# Patient Record
Sex: FEMALE | Race: WHITE | NOT HISPANIC OR LATINO
[De-identification: names, ages, dates, MRNs, and addresses within clinical notes are randomized per-mention and may not be internally consistent; named-entity substitution may affect disease eponyms.]

---

## 2017-01-19 ENCOUNTER — APPOINTMENT (OUTPATIENT)
Dept: UROLOGY | Facility: CLINIC | Age: 62
End: 2017-01-19

## 2017-03-13 ENCOUNTER — OUTPATIENT (OUTPATIENT)
Dept: OUTPATIENT SERVICES | Facility: HOSPITAL | Age: 62
LOS: 1 days | Discharge: HOME | End: 2017-03-13

## 2017-03-13 DIAGNOSIS — D05.00 LOBULAR CARCINOMA IN SITU OF UNSPECIFIED BREAST: ICD-10-CM

## 2017-03-13 DIAGNOSIS — D30.00 BENIGN NEOPLASM OF UNSPECIFIED KIDNEY: ICD-10-CM

## 2017-03-15 ENCOUNTER — APPOINTMENT (OUTPATIENT)
Dept: HEMATOLOGY ONCOLOGY | Facility: CLINIC | Age: 62
End: 2017-03-15

## 2017-03-15 VITALS
DIASTOLIC BLOOD PRESSURE: 79 MMHG | WEIGHT: 189.4 LBS | HEART RATE: 78 BPM | TEMPERATURE: 97.8 F | RESPIRATION RATE: 16 BRPM | OXYGEN SATURATION: 97 % | SYSTOLIC BLOOD PRESSURE: 150 MMHG | BODY MASS INDEX: 29.23 KG/M2

## 2017-08-01 ENCOUNTER — OUTPATIENT (OUTPATIENT)
Dept: OUTPATIENT SERVICES | Facility: HOSPITAL | Age: 62
LOS: 1 days | Discharge: HOME | End: 2017-08-01
Payer: COMMERCIAL

## 2017-08-01 VITALS
WEIGHT: 179.9 LBS | HEART RATE: 69 BPM | HEIGHT: 68 IN | RESPIRATION RATE: 69 BRPM | OXYGEN SATURATION: 98 % | SYSTOLIC BLOOD PRESSURE: 125 MMHG | DIASTOLIC BLOOD PRESSURE: 78 MMHG | TEMPERATURE: 98 F

## 2017-08-01 DIAGNOSIS — E78.5 HYPERLIPIDEMIA, UNSPECIFIED: ICD-10-CM

## 2017-08-01 DIAGNOSIS — D05.00 LOBULAR CARCINOMA IN SITU OF UNSPECIFIED BREAST: ICD-10-CM

## 2017-08-01 DIAGNOSIS — E11.9 TYPE 2 DIABETES MELLITUS WITHOUT COMPLICATIONS: ICD-10-CM

## 2017-08-01 DIAGNOSIS — I10 ESSENTIAL (PRIMARY) HYPERTENSION: ICD-10-CM

## 2017-08-01 DIAGNOSIS — Z01.818 ENCOUNTER FOR OTHER PREPROCEDURAL EXAMINATION: ICD-10-CM

## 2017-08-01 LAB
ANION GAP SERPL CALC-SCNC: 10 MMOL/L — SIGNIFICANT CHANGE UP (ref 5–17)
APTT BLD: 40.9 SEC — HIGH (ref 27.5–37.4)
BUN SERPL-MCNC: 13 MG/DL — SIGNIFICANT CHANGE UP (ref 7–23)
CALCIUM SERPL-MCNC: 10.1 MG/DL — SIGNIFICANT CHANGE UP (ref 8.5–10.1)
CHLORIDE SERPL-SCNC: 100 MMOL/L — SIGNIFICANT CHANGE UP (ref 96–108)
CO2 SERPL-SCNC: 29 MMOL/L — SIGNIFICANT CHANGE UP (ref 22–31)
CREAT SERPL-MCNC: 0.6 MG/DL — SIGNIFICANT CHANGE UP (ref 0.5–1.3)
GLUCOSE SERPL-MCNC: 142 MG/DL — HIGH (ref 70–99)
HCT VFR BLD CALC: 45.5 % — HIGH (ref 34.5–45)
HGB BLD-MCNC: 14.3 G/DL — SIGNIFICANT CHANGE UP (ref 11.5–15.5)
INR BLD: 0.96 RATIO — SIGNIFICANT CHANGE UP (ref 0.88–1.16)
MCHC RBC-ENTMCNC: 28.9 PG — SIGNIFICANT CHANGE UP (ref 27–34)
MCHC RBC-ENTMCNC: 31.4 GM/DL — LOW (ref 32–36)
MCV RBC AUTO: 92.2 FL — SIGNIFICANT CHANGE UP (ref 80–100)
PLATELET # BLD AUTO: 303 K/UL — SIGNIFICANT CHANGE UP (ref 150–400)
POTASSIUM SERPL-MCNC: 4.2 MMOL/L — SIGNIFICANT CHANGE UP (ref 3.5–5.3)
POTASSIUM SERPL-SCNC: 4.2 MMOL/L — SIGNIFICANT CHANGE UP (ref 3.5–5.3)
PROTHROM AB SERPL-ACNC: 10.5 SEC — SIGNIFICANT CHANGE UP (ref 9.8–12.7)
RBC # BLD: 4.94 M/UL — SIGNIFICANT CHANGE UP (ref 3.8–5.2)
RBC # FLD: 13.7 % — SIGNIFICANT CHANGE UP (ref 11–15)
SODIUM SERPL-SCNC: 139 MMOL/L — SIGNIFICANT CHANGE UP (ref 135–145)
WBC # BLD: 8.4 K/UL — SIGNIFICANT CHANGE UP (ref 3.8–10.5)
WBC # FLD AUTO: 8.4 K/UL — SIGNIFICANT CHANGE UP (ref 3.8–10.5)

## 2017-08-01 PROCEDURE — 93010 ELECTROCARDIOGRAM REPORT: CPT | Mod: NC

## 2017-08-01 NOTE — H&P PST ADULT - FAMILY HISTORY
Father  Still living? Unknown  Family history of stroke, Age at diagnosis: Age Unknown     Mother  Still living? Unknown  Family history of Parkinson disease, Age at diagnosis: Age Unknown

## 2017-08-01 NOTE — H&P PST ADULT - ASSESSMENT
61 YOF with a history of breast cancer, DM, HTN here c/o congestion and hoarse voice. Denies any complaints. On exam, Left nasal congestion noted, with hoarse voice. Plan for direct laringoscopy with excision of vocal cords polyps, septoplasty and left nasal valve repair on 8/9/17. Will need medical clearance. 61 YOF with a history of breast cancer, DM, HTN here c/o congestion and hoarse voice. Denies any complaints. On exam, Left nasal congestion noted, with hoarse voice. Plan for direct microlaryngoscopy with excision of vocal cords polyp, septoplasty and left nasal valve repair on 8/9/17. Will need medical clearance.

## 2017-08-01 NOTE — H&P PST ADULT - HISTORY OF PRESENT ILLNESS
61 YOF with history of breast cancer, HTN and DM here c/o congestion for many years and hoarse voice since march. States she had sinus surgery in 2011 however did not help. Took Medrol and Flonase without any help. Denies any other complaints. Denies cp, sob, palpitations, dizziness, headache, N/V, diarrhea, constipation. 61 YOF with history of breast cancer, HTN and DM here c/o congestion for many years and hoarse voice since March. States she had sinus surgery in 2011 however did not help. Took Medrol and Flonase without any help. Noted to have a polypoid growth from right arytenoid hitting the false cord and irritating the right vocal cord.  Also a left nasal septal deviation with superior adhesion/narrow left nasal valve.     Denies any other complaints. Denies cp, sob, palpitations, dizziness, headache, N/V, diarrhea, constipation.     Elevated preop PTT 40.9 but repeat normal 34.4.

## 2017-08-01 NOTE — H&P PST ADULT - PSH
H/O:   x 2    History of Dilation and Curettage    History of Tonsillectomy    S/P Exploratory Laparotomy    S/P GABRIEL-BSO (Total Abdominal Hysterectomy and Bilateral Salpingo-Oophorectomy)  2009

## 2017-08-01 NOTE — H&P PST ADULT - PMH
Diabetes    HLD (Hyperlipidemia)    HTN (Hypertension)    Lobular Carcinoma in Situ of Breast    Uterine Fibroids

## 2017-08-09 ENCOUNTER — RESULT REVIEW (OUTPATIENT)
Age: 62
End: 2017-08-09

## 2017-08-09 ENCOUNTER — TRANSCRIPTION ENCOUNTER (OUTPATIENT)
Age: 62
End: 2017-08-09

## 2017-08-09 ENCOUNTER — OUTPATIENT (OUTPATIENT)
Dept: OUTPATIENT SERVICES | Facility: HOSPITAL | Age: 62
LOS: 1 days | Discharge: HOME | End: 2017-08-09
Payer: COMMERCIAL

## 2017-08-09 VITALS
HEART RATE: 86 BPM | SYSTOLIC BLOOD PRESSURE: 120 MMHG | TEMPERATURE: 98 F | DIASTOLIC BLOOD PRESSURE: 74 MMHG | OXYGEN SATURATION: 96 % | RESPIRATION RATE: 19 BRPM

## 2017-08-09 VITALS
HEART RATE: 77 BPM | WEIGHT: 179.9 LBS | DIASTOLIC BLOOD PRESSURE: 77 MMHG | HEIGHT: 67 IN | RESPIRATION RATE: 18 BRPM | TEMPERATURE: 98 F | SYSTOLIC BLOOD PRESSURE: 132 MMHG | OXYGEN SATURATION: 96 %

## 2017-08-09 PROCEDURE — 88311 DECALCIFY TISSUE: CPT | Mod: 26

## 2017-08-09 PROCEDURE — 88304 TISSUE EXAM BY PATHOLOGIST: CPT | Mod: 26

## 2017-08-09 PROCEDURE — 88305 TISSUE EXAM BY PATHOLOGIST: CPT | Mod: 26

## 2017-08-09 RX ORDER — OXYMETAZOLINE HYDROCHLORIDE 0.5 MG/ML
3 SPRAY NASAL ONCE
Qty: 0 | Refills: 0 | Status: COMPLETED | OUTPATIENT
Start: 2017-08-09 | End: 2017-08-09

## 2017-08-09 RX ORDER — CANAGLIFLOZIN 100 MG/1
1 TABLET, FILM COATED ORAL
Qty: 0 | Refills: 0 | COMMUNITY

## 2017-08-09 RX ORDER — ASPIRIN/CALCIUM CARB/MAGNESIUM 324 MG
0 TABLET ORAL
Qty: 0 | Refills: 0 | COMMUNITY

## 2017-08-09 RX ORDER — METFORMIN HYDROCHLORIDE 850 MG/1
1 TABLET ORAL
Qty: 0 | Refills: 0 | COMMUNITY

## 2017-08-09 RX ORDER — SITAGLIPTIN 50 MG/1
1 TABLET, FILM COATED ORAL
Qty: 0 | Refills: 0 | COMMUNITY

## 2017-08-09 RX ORDER — SODIUM CHLORIDE 9 MG/ML
3 INJECTION INTRAMUSCULAR; INTRAVENOUS; SUBCUTANEOUS EVERY 8 HOURS
Qty: 0 | Refills: 0 | Status: DISCONTINUED | OUTPATIENT
Start: 2017-08-09 | End: 2017-08-09

## 2017-08-09 RX ORDER — DILTIAZEM HCL 120 MG
1 CAPSULE, EXT RELEASE 24 HR ORAL
Qty: 0 | Refills: 0 | COMMUNITY

## 2017-08-09 RX ORDER — COCAINE HCL 4 %
1 SOLUTION, NON-ORAL TOPICAL ONCE
Qty: 0 | Refills: 0 | Status: DISCONTINUED | OUTPATIENT
Start: 2017-08-09 | End: 2017-08-09

## 2017-08-09 RX ADMIN — OXYMETAZOLINE HYDROCHLORIDE 3 SPRAY(S): 0.5 SPRAY NASAL at 12:51

## 2017-08-09 NOTE — BRIEF OPERATIVE NOTE - PRE-OP DX
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

## 2017-08-09 NOTE — ASU DISCHARGE PLAN (ADULT/PEDIATRIC). - INSTRUCTIONS
Eat light first 24 hours and progress diet slowly to avoid nausea and vomiting from anesthesia. Call for appt ASAP [phone]

## 2017-08-09 NOTE — ASU DISCHARGE PLAN (ADULT/PEDIATRIC). - SPECIAL INSTRUCTIONS
For laryngeal surgery:    Absolute voice rest x 24 hours, then very limited voice use x 7 days, longer if hoarseness or throat pain develops.  No aspirin, Motrin, Ibuprofen, Advil, Aleve, Pepto Bismo, [place] - no blood thinners - for 3 weeks  Let hot foods and liquids cool somewhat for 3-4 days, softer foods 3-4 days  Limited activity x 7 days  Take antibiotic and probiotic (i.e. Acidophilus) as prescribed  Take pain med prescribed as needed    For nasal surgery:  Will leave nasal silastic stents in place for 10 days    Change nasal drip pad as needed  No nose blowing, bending, straining or lifting x 7 days  Limited activity x 7 days  Avoid Aspirin, Motrin, Ibuprofen, Advil, Aleve, Pepto Bismo, [place], Vit E, etc. - No Blood Thinners - x 3 weeks  Take antibiotic and probiotic (i.e. Acidophilus) as prescribed  Take pain med prescribed as needed  Tomorrow begin Nasogel spray: 2-3 sprays each nostril 3x/day

## 2017-08-09 NOTE — ASU DISCHARGE PLAN (ADULT/PEDIATRIC). - MEDICATION SUMMARY - MEDICATIONS TO TAKE
I will START or STAY ON the medications listed below when I get home from the hospital:    Tiazac 180 mg/24 hours oral capsule, extended release  -- 1 cap(s) by mouth once a day  -- Indication: For HTN    metFORMIN 500 mg oral tablet  -- 1 tab(s) by mouth 2 times a day  -- Indication: For DM    Januvia 100 mg oral tablet  -- 1 tab(s) by mouth once a day  -- Indication: For DM    Invokana 100 mg oral tablet  -- 1 tab(s) by mouth once a day  -- Indication: For DM    Diovan  mg-12.5 mg oral tablet  -- 1 tab(s) by mouth once a day  -- Indication: For HTN

## 2017-08-09 NOTE — BRIEF OPERATIVE NOTE - PROCEDURE
Outfracture or cautery of nasal turbinates  08/09/2017  Bilateral  Active  RBECKHARDT  Laryngoscopy, direct, microscope, with tumor excision  08/09/2017  Right arytenoid, left true vocal cord,   Lesions appeared identical, not typical for polyps, nodules or papilloma,  Active  RBECKHARDT  Nasal septoplasty  08/09/2017    Active  RBECKHARDT  Repair of nasal valve stenosis on left side  08/09/2017    Active  RBECKHARDT  Lysis of adhesions of nose  08/09/2017  Left inferior turbinate to septum  Active  RBECKHARDT

## 2017-08-09 NOTE — ASU DISCHARGE PLAN (ADULT/PEDIATRIC). - NOTIFY
Excessive Diarrhea/Inability to Tolerate Liquids or Foods/Unable to Urinate/Bleeding that does not stop/Swelling that continues/Pain not relieved by Medications/Fever greater than 101/Persistent Nausea and Vomiting/Increased Irritability or Sluggishness

## 2017-08-09 NOTE — BRIEF OPERATIVE NOTE - POST-OP DX
Benign neoplasm of arytenoid fold  08/09/2017  Right  Active  Andre Hannah  Deviated nasal septum  08/09/2017    Active  Andre Hannah  Nasal turbinate hypertrophy  08/09/2017  Inferior, bilateral  Active  Andre Hannah  Nasal valve stenosis  08/09/2017  Left  Active  Andre Hannah Benign neoplasm of arytenoid fold  08/09/2017  Right  Active  Andre Hannah  Deviated nasal septum  08/09/2017    Active  Andre Hannah  Nasal turbinate hypertrophy  08/09/2017  Inferior, bilateral  Active  Andre Hannah  Nasal valve stenosis  08/09/2017  Left  Active  Andre Hannah  Vocal cord mass  08/09/2017  Left, superior lateral surface, mid cord.  Active  Andre Hannah

## 2017-08-14 LAB — SURGICAL PATHOLOGY FINAL REPORT - CH: SIGNIFICANT CHANGE UP

## 2017-08-15 DIAGNOSIS — E78.5 HYPERLIPIDEMIA, UNSPECIFIED: ICD-10-CM

## 2017-08-15 DIAGNOSIS — Z90.710 ACQUIRED ABSENCE OF BOTH CERVIX AND UTERUS: ICD-10-CM

## 2017-08-15 DIAGNOSIS — J34.89 OTHER SPECIFIED DISORDERS OF NOSE AND NASAL SINUSES: ICD-10-CM

## 2017-08-15 DIAGNOSIS — Z85.3 PERSONAL HISTORY OF MALIGNANT NEOPLASM OF BREAST: ICD-10-CM

## 2017-08-15 DIAGNOSIS — J34.3 HYPERTROPHY OF NASAL TURBINATES: ICD-10-CM

## 2017-08-15 DIAGNOSIS — D14.1 BENIGN NEOPLASM OF LARYNX: ICD-10-CM

## 2017-08-15 DIAGNOSIS — R49.0 DYSPHONIA: ICD-10-CM

## 2017-08-15 DIAGNOSIS — I10 ESSENTIAL (PRIMARY) HYPERTENSION: ICD-10-CM

## 2017-08-15 DIAGNOSIS — E11.9 TYPE 2 DIABETES MELLITUS WITHOUT COMPLICATIONS: ICD-10-CM

## 2017-08-15 DIAGNOSIS — J34.2 DEVIATED NASAL SEPTUM: ICD-10-CM

## 2017-09-13 ENCOUNTER — OUTPATIENT (OUTPATIENT)
Dept: OUTPATIENT SERVICES | Facility: HOSPITAL | Age: 62
LOS: 1 days | Discharge: HOME | End: 2017-09-13

## 2017-09-13 DIAGNOSIS — D05.00 LOBULAR CARCINOMA IN SITU OF UNSPECIFIED BREAST: ICD-10-CM

## 2018-03-06 ENCOUNTER — OUTPATIENT (OUTPATIENT)
Dept: OUTPATIENT SERVICES | Facility: HOSPITAL | Age: 63
LOS: 1 days | Discharge: HOME | End: 2018-03-06

## 2018-03-06 DIAGNOSIS — D30.00 BENIGN NEOPLASM OF UNSPECIFIED KIDNEY: ICD-10-CM

## 2018-03-06 DIAGNOSIS — D05.00 LOBULAR CARCINOMA IN SITU OF UNSPECIFIED BREAST: ICD-10-CM

## 2018-03-09 ENCOUNTER — APPOINTMENT (OUTPATIENT)
Dept: HEMATOLOGY ONCOLOGY | Facility: CLINIC | Age: 63
End: 2018-03-09
Payer: COMMERCIAL

## 2018-03-09 VITALS
WEIGHT: 186 LBS | RESPIRATION RATE: 16 BRPM | HEART RATE: 71 BPM | DIASTOLIC BLOOD PRESSURE: 80 MMHG | TEMPERATURE: 98.4 F | SYSTOLIC BLOOD PRESSURE: 124 MMHG | BODY MASS INDEX: 28.7 KG/M2 | OXYGEN SATURATION: 98 %

## 2018-03-09 DIAGNOSIS — J38.1 POLYP OF VOCAL CORD AND LARYNX: ICD-10-CM

## 2018-03-09 DIAGNOSIS — D30.00 BENIGN NEOPLASM OF UNSPECIFIED KIDNEY: ICD-10-CM

## 2018-03-09 PROCEDURE — 99213 OFFICE O/P EST LOW 20 MIN: CPT

## 2018-03-09 RX ORDER — UBIDECARENONE 100 MG
100 CAPSULE ORAL
Refills: 0 | Status: ACTIVE | COMMUNITY
Start: 2018-03-09

## 2018-10-25 ENCOUNTER — APPOINTMENT (OUTPATIENT)
Dept: UROLOGY | Facility: CLINIC | Age: 63
End: 2018-10-25

## 2019-01-09 ENCOUNTER — APPOINTMENT (OUTPATIENT)
Dept: UROLOGY | Facility: CLINIC | Age: 64
End: 2019-01-09

## 2019-02-01 ENCOUNTER — APPOINTMENT (OUTPATIENT)
Dept: BREAST CENTER | Facility: CLINIC | Age: 64
End: 2019-02-01

## 2019-02-25 ENCOUNTER — OUTPATIENT (OUTPATIENT)
Dept: OUTPATIENT SERVICES | Facility: HOSPITAL | Age: 64
LOS: 1 days | Discharge: HOME | End: 2019-02-25

## 2019-02-25 DIAGNOSIS — D05.00 LOBULAR CARCINOMA IN SITU OF UNSPECIFIED BREAST: ICD-10-CM

## 2019-02-25 DIAGNOSIS — D30.00 BENIGN NEOPLASM OF UNSPECIFIED KIDNEY: ICD-10-CM

## 2019-03-01 ENCOUNTER — OUTPATIENT (OUTPATIENT)
Dept: OUTPATIENT SERVICES | Facility: HOSPITAL | Age: 64
LOS: 1 days | Discharge: HOME | End: 2019-03-01

## 2019-03-01 ENCOUNTER — APPOINTMENT (OUTPATIENT)
Dept: HEMATOLOGY ONCOLOGY | Facility: CLINIC | Age: 64
End: 2019-03-01
Payer: COMMERCIAL

## 2019-03-01 VITALS
SYSTOLIC BLOOD PRESSURE: 121 MMHG | OXYGEN SATURATION: 99 % | DIASTOLIC BLOOD PRESSURE: 77 MMHG | BODY MASS INDEX: 28.75 KG/M2 | TEMPERATURE: 98.2 F | WEIGHT: 186.29 LBS | HEART RATE: 64 BPM | RESPIRATION RATE: 16 BRPM

## 2019-03-01 DIAGNOSIS — Z00.00 ENCOUNTER FOR GENERAL ADULT MEDICAL EXAMINATION W/OUT ABNORMAL FINDINGS: ICD-10-CM

## 2019-03-01 DIAGNOSIS — D30.00 BENIGN NEOPLASM OF UNSPECIFIED KIDNEY: ICD-10-CM

## 2019-03-01 PROCEDURE — 99213 OFFICE O/P EST LOW 20 MIN: CPT

## 2019-03-01 NOTE — REVIEW OF SYSTEMS
[Insomnia] : insomnia [Anxiety] : anxiety [Negative] : Allergic/Immunologic [FreeTextEntry2] : Energy level WNL. Had flu  vaccination 9/2018. [FreeTextEntry4] : Had repeat removal of polyp from vocal cords 8/2018.  Had follow up 2/1/2019, exam OK. [FreeTextEntry7] : Last colonoscopy 11/2018 with Dr Lul Rose, polyps found.Was told to repeat in 2021. [FreeTextEntry8] : S/P GABRIEL/BSO, Last GYN exam 2018. Denies abnormal  spotting/bleeding. [FreeTextEntry9] : Most recent BMD 10/21/2016. [de-identified] : Ongoing eczema. [de-identified] : Insomnia  and anxiety controlled with lorazepam.

## 2019-03-01 NOTE — HISTORY OF PRESENT ILLNESS
[Disease: _____________________] : Disease: [unfilled] [T: ___] : T[unfilled] [AJCC Stage: ____] : AJCC Stage: [unfilled] [Treatment Protocol] : Treatment Protocol [de-identified] : The patient presented age 44 with LCIS had a breast biopsy.\par \par In sagittal and the patient decided she wanted to initiate chemoprevention.\par \par In July 2011 the patient initiated treatment with tamoxifen which she continues through the present time.\par \par The patient has had a negative multisite 3 BRACAnalysis. [FreeTextEntry1] : Currently on Tamoxifen from 07/2011 - 8/22/2016. [de-identified] : The patient has been 18  years 1  month since LCIS diagnosis.\par \par The patient  completed  tamoxifen  5 years of Tamoxifen 2  years  6  months ago.\par \par Breast MRI 2/1/2019 stable, BIRADS 2. Liver not mentioned.\par \par Most recent bilateral mammogram/breast ultrasound was 7/9/2018, BI RADS 2\par \par Last  saw breast surgeon Lexis Robertson  03/20/2015. .\par \par MRI abdomen/pelvis 10/4/2018, enlarging angiomyolipoma, stable hepatic hemangiomas.\par \par Has new PCP Dr Claudia Padilla, had exam 10/2018, seeing her again later in 3/2019.\par \par No other interval events.\par \par No other interval event since last seen.\par \par \par \par  \par \par

## 2019-03-01 NOTE — PHYSICAL EXAM
[Fully active, able to carry on all pre-disease performance without restriction] : Status 0 - Fully active, able to carry on all pre-disease performance without restriction [Normal] : grossly intact [de-identified] : 1/6 MK [id number] [de-identified] : Asymmetry, right breast larger than left. Right breast: scar 12:00 extending to UIQ no masses.  Left breast:  no masses [de-identified] : Transverse scar lower abdomen [de-identified] : Episode of crying in treatment room

## 2020-07-29 ENCOUNTER — APPOINTMENT (OUTPATIENT)
Dept: UROLOGY | Facility: CLINIC | Age: 65
End: 2020-07-29

## 2020-12-30 ENCOUNTER — APPOINTMENT (OUTPATIENT)
Dept: BREAST CENTER | Facility: CLINIC | Age: 65
End: 2020-12-30

## 2021-01-08 ENCOUNTER — APPOINTMENT (OUTPATIENT)
Dept: BREAST CENTER | Facility: CLINIC | Age: 66
End: 2021-01-08
Payer: COMMERCIAL

## 2021-01-08 VITALS
DIASTOLIC BLOOD PRESSURE: 69 MMHG | BODY MASS INDEX: 28.95 KG/M2 | HEART RATE: 78 BPM | WEIGHT: 191 LBS | HEIGHT: 68 IN | SYSTOLIC BLOOD PRESSURE: 125 MMHG

## 2021-01-08 DIAGNOSIS — E04.1 NONTOXIC SINGLE THYROID NODULE: ICD-10-CM

## 2021-01-08 PROCEDURE — 99213 OFFICE O/P EST LOW 20 MIN: CPT

## 2021-01-08 PROCEDURE — 99072 ADDL SUPL MATRL&STAF TM PHE: CPT

## 2021-01-08 RX ORDER — ROSUVASTATIN CALCIUM 10 MG/1
10 TABLET, FILM COATED ORAL
Refills: 0 | Status: ACTIVE | COMMUNITY

## 2021-01-08 RX ORDER — ROSUVASTATIN CALCIUM 5 MG/1
5 TABLET, FILM COATED ORAL
Refills: 0 | Status: DISCONTINUED | COMMUNITY
Start: 2018-03-09 | End: 2021-01-08

## 2021-01-08 RX ORDER — METFORMIN HYDROCHLORIDE 1000 MG/1
1000 TABLET, FILM COATED ORAL
Refills: 0 | Status: ACTIVE | COMMUNITY

## 2021-01-08 RX ORDER — B-COMPLEX WITH VITAMIN C
TABLET ORAL
Refills: 0 | Status: ACTIVE | COMMUNITY

## 2021-01-08 NOTE — PHYSICAL EXAM
[EOMI] : extra ocular movement intact [Sclera nonicteric] : sclera nonicteric [Supple] : supple [No Supraclavicular Adenopathy] : no supraclavicular adenopathy [No Cervical Adenopathy] : no cervical adenopathy [No Thyromegaly] : no thyromegaly [Clear to Auscultation Bilat] : clear to auscultation bilaterally [Normal Sinus Rhythm] : normal sinus rhythm [Normal S1, S2] : normal S1 and S2 [Examined in the supine and seated position] : examined in the supine and seated position [No dominant masses] : no dominant masses in right breast  [No dominant masses] : no dominant masses left breast [No Nipple Retraction] : no left nipple retraction [No Nipple Discharge] : no left nipple discharge [No Axillary Lymphadenopathy] : no left axillary lymphadenopathy [Soft] : abdomen soft [Not Tender] : non-tender [de-identified] : Scar UIQ. [de-identified] : Slightly glandular prominence 2:00- office ultrasound normal.

## 2021-01-08 NOTE — DATA REVIEWED
[FreeTextEntry1] : Bilateral breast MRI 3/5/2020:  No MRI evidence of malignancy. (Stable enhancing mass lower outer right)\par \par Bilateral mammogram 10/7/2020  Right focal asymmetry and left asymmetry.  Recall for additional views.\par \par Bilateral mammogram 10/7/2020:  Right asymmetry effaces.  Left asymmetry effaces.\par \par Bilateral breast ultrasound 10/7/2020:  Right stable HN 1N5 [id number] mm since 8/2019.  Benign cyst 3N1, hypoechoic mass 11N4 9x5x9 stable since 2009. Follow-up right ultrasound in 6 months.\par \par (Current images reviewed and returned to the patient.)

## 2021-01-08 NOTE — PAST MEDICAL HISTORY
[Postmenopausal] : The patient is postmenopausal [Menarche Age ____] : age at menarche was [unfilled] [Total Preg ___] : G[unfilled] [Live Births ___] : P[unfilled]  [Age At Live Birth ___] : Age at live birth: [unfilled] [History of Hormone Replacement Treatment] : has no history of hormone replacement treatment [FreeTextEntry2] : 2 sons [FreeTextEntry7] : no [FreeTextEntry8] : 10 months each

## 2021-09-07 ENCOUNTER — NON-APPOINTMENT (OUTPATIENT)
Age: 66
End: 2021-09-07

## 2021-09-07 ENCOUNTER — APPOINTMENT (OUTPATIENT)
Dept: ORTHOPEDIC SURGERY | Facility: CLINIC | Age: 66
End: 2021-09-07
Payer: COMMERCIAL

## 2021-09-07 VITALS — HEIGHT: 68 IN | BODY MASS INDEX: 27.28 KG/M2 | WEIGHT: 180 LBS

## 2021-09-07 DIAGNOSIS — M70.62 TROCHANTERIC BURSITIS, LEFT HIP: ICD-10-CM

## 2021-09-07 DIAGNOSIS — M54.16 RADICULOPATHY, LUMBAR REGION: ICD-10-CM

## 2021-09-07 PROCEDURE — 99204 OFFICE O/P NEW MOD 45 MIN: CPT | Mod: 25

## 2021-09-07 PROCEDURE — 72100 X-RAY EXAM L-S SPINE 2/3 VWS: CPT

## 2021-09-07 PROCEDURE — 73502 X-RAY EXAM HIP UNI 2-3 VIEWS: CPT | Mod: LT

## 2021-09-07 PROCEDURE — 20610 DRAIN/INJ JOINT/BURSA W/O US: CPT | Mod: LT

## 2021-09-07 NOTE — PHYSICAL EXAM
[Antalgic] : antalgic [Normal RUE] : Right Upper Extremity: No scars, rashes, lesions, ulcers, skin intact [Normal Finger/nose] : finger to nose coordination [Normal Touch] : sensation intact for touch [Poor Appearance] : well-appearing [Acute Distress] : not in acute distress [Obese] : obese [Normal] : no peripheral adenopathy appreciated [de-identified] : Patient appears stated age in no acute respiratory distress. Patient is alert oriented x3. Patient has normal mood and affect. \par Bilateral knee exam\par Range of motion of the knee is 0-120°. \par Skin is normal.  No rash.\par There is no effusion. No medial or lateral joint line tenderness. No swelling, no pitting edema.\par Overall alignment of the knee is then slight varus. Good anterior posterior stability. Firm endpoints on anterior and posterior drawer. \par Medial lateral stability is intact. Firm endpoint on medial and lateral stress testing .\par Luis test is negative.\par Quadriceps strength 5/ 5. There is no loss of muscle volume in the thigh. \par Good anterior posterior and mediolateral stability.\par Sensation in the extremities intact. \par Discrimination is intact. Good DP and PT pulses.\par 		\par \par Bilateral hip exam\par On inspection of the hip shows skin is normal. No evidence of rash. \par No loss of muscle.  Abductor strength is 5 out of 5. Hip flexor strength is 5.\par Range of motion of the hip at 90° flexion internal rotation is 15° external rotation is 30° pain-free. \par Hip has good stability in anterior and posterior direction. \par On lateral decubitus  examination there is no tenderness in the greater trochanter.  On the left hip there is tenderness in the posterior part of the greater trochanter\par Lower Extremity Examination \par Bilateral lower extremity skin is normal. There is no rash. There is no edema and lymphadenopathy.  DP and PT pulses intact. Sensation is intact. [de-identified] : X-rays of the left hip 2 view shows well-preserved joint space no bony abnormalities.\par \par X-rays of the lumbosacral spine 2 view shows mild degenerative changes.

## 2021-09-07 NOTE — DISCUSSION/SUMMARY
[de-identified] : Patient has left hip greater trochanteric bursitis.  Patient also has some lumbar degenerative changes.\par \par She is considering bursa injection\par \par \par \par Risks and benefits of the trochanteric bursa injection was discussed with the patient. Verbal consent was obtained. Area was prepped with Betadine. Local anesthetic spray was used. 2 cc of Depo-Medrol 3 cc of lidocaine was injected in the posterior part of the greater trochanter Patient tolerated the procedure well. Band-Aid was applied.\par \par NSAIDs therapy

## 2021-09-07 NOTE — HISTORY OF PRESENT ILLNESS
[de-identified] : Patient is here for left hip pain \par \par The patient's past medical history, past surgical history, medications and allergies were reviewed by me today and documented accordingly. In addition, the patient's family and social history, which were noncontributory to this visit, were reviewed also. Intake form was reviewed. The patient has no family history of arthritis.  [2] : a minimum pain level of 2/10 [8] : a maximum pain level of 8/10 [Acetaminophen] : not relieved by acetaminophen [Exercise Regimen] : not relieved by exercise regimen [NSAIDs] : not relieved by nonsteroidal anti-inflammatory drugs

## 2021-09-23 NOTE — H&P PST ADULT - CARDIOVASCULAR
Chief Complaints and History of Present Illnesses   Patient presents with     Diabetic Eye Exam Follow Up     Type 2 diabetes mellitus without complication, with long-term current use of insulin      Chief Complaint(s) and History of Present Illness(es)     Diabetic Eye Exam Follow Up     Associated symptoms: Negative for photophobia, floaters, flashes and tearing    Diabetes Type: Type 2    Blood Sugars: is controlled    Treatments tried: no treatments    Pain scale: 0/10    Comments: Type 2 diabetes mellitus without complication, with long-term current use of insulin               Comments     Pt here for Diabetic eye exam  LBS:  83    Last A1C: 5.9  Lab Results       Component                Value               Date                       A1C                      5.9                 08/30/2021                 A1C                      5.8                 02/19/2021                 A1C                      5.8                 11/20/2020       Lexi Alfred COT 9:03 AM September 23, 2021                             negative Regular rate & rhythm, normal S1, S2; no murmurs, gallops or rubs; no S3, S4

## 2021-10-26 ENCOUNTER — APPOINTMENT (OUTPATIENT)
Dept: WOUND CARE | Facility: HOSPITAL | Age: 66
End: 2021-10-26
Payer: COMMERCIAL

## 2021-10-26 ENCOUNTER — OUTPATIENT (OUTPATIENT)
Dept: OUTPATIENT SERVICES | Facility: HOSPITAL | Age: 66
LOS: 1 days | Discharge: HOME | End: 2021-10-26
Payer: COMMERCIAL

## 2021-10-26 VITALS
WEIGHT: 176 LBS | DIASTOLIC BLOOD PRESSURE: 76 MMHG | HEIGHT: 67 IN | BODY MASS INDEX: 27.62 KG/M2 | TEMPERATURE: 97.5 F | HEART RATE: 81 BPM | RESPIRATION RATE: 18 BRPM | OXYGEN SATURATION: 100 % | SYSTOLIC BLOOD PRESSURE: 134 MMHG

## 2021-10-26 DIAGNOSIS — L89.300 PRESSURE ULCER OF UNSPECIFIED BUTTOCK, UNSTAGEABLE: ICD-10-CM

## 2021-10-26 PROCEDURE — G0463: CPT

## 2021-10-26 PROCEDURE — 99203 OFFICE O/P NEW LOW 30 MIN: CPT

## 2021-10-26 RX ORDER — SEMAGLUTIDE 1.34 MG/ML
2 INJECTION, SOLUTION SUBCUTANEOUS WEEKLY
Refills: 0 | Status: ACTIVE | COMMUNITY

## 2021-10-26 RX ORDER — DICLOFENAC SODIUM 75 MG/1
75 TABLET, DELAYED RELEASE ORAL TWICE DAILY
Qty: 20 | Refills: 1 | Status: DISCONTINUED | COMMUNITY
Start: 2021-09-07 | End: 2021-10-26

## 2021-10-26 RX ORDER — CANAGLIFLOZIN AND METFORMIN HYDROCHLORIDE 150; 1000 MG/1; MG/1
TABLET, FILM COATED, EXTENDED RELEASE ORAL TWICE DAILY
Refills: 0 | Status: ACTIVE | COMMUNITY

## 2021-10-26 RX ORDER — CANAGLIFLOZIN 300 MG/1
TABLET, FILM COATED ORAL
Refills: 0 | Status: DISCONTINUED | COMMUNITY
End: 2021-10-26

## 2021-10-26 NOTE — HISTORY OF PRESENT ILLNESS
[FreeTextEntry1] : Patient presents with a traumatic wound on her left buttocks.  Patient reports that in early 9/2021 she was prescribed Flexeral, lidocaine patch and heating pad for back pain.  Patient believes that she developed the wound from heating pad.  Patient was seen by PCP and is applying Collagenase and duoderm to wound every 3 days.

## 2021-10-26 NOTE — ASSESSMENT
[Verbal] : Verbal [Patient] : Patient [Good - alert, interested, motivated] : Good - alert, interested, motivated [Verbalizes knowledge/Understanding] : Verbalizes knowledge/understanding [Dressing changes] : dressing changes [Skin Care] : skin care [Signs and symptoms of infection] : sign and symptoms of infection [How and When to Call] : how and when to call [Patient responsibility to plan of care] : patient responsibility to plan of care [] : Yes [Stable] : stable [Home] : Home [Ambulatory] : Ambulatory [Not Applicable - Long Term Care/Home Health Agency] : Long Term Care/Home Health Agency: Not Applicable [FreeTextEntry2] : Promote optimal skin integrity, infection prevention\par  [FreeTextEntry4] : f/u 2 weeks

## 2021-10-26 NOTE — REVIEW OF SYSTEMS
[Negative] : Heme/Lymph [FreeTextEntry5] : hypertension, hyperlipidemia [de-identified] : burn left buttock [de-identified] : DM2

## 2021-10-26 NOTE — PHYSICAL EXAM
[Normal Breath Sounds] : Normal breath sounds [Normal Heart Sounds] : normal heart sounds [Alert] : alert [Oriented to Person] : oriented to person [Oriented to Place] : oriented to place [Oriented to Time] : oriented to time [Calm] : calm [2 x 2] : 2 x 2  [de-identified] : WD WN 66 Y/O with female in NAD [de-identified] : MONI LEWISM intact [de-identified] : supple [de-identified] : left buttock small almost completely closed buttock burn [de-identified] : MD lifted dry eschar revealing small open area [FreeTextEntry1] : Left buttocks [FreeTextEntry2] : 1.1 [FreeTextEntry3] : 3.0 [FreeTextEntry4] : 0.1 / dry eschar [de-identified] : small serosanguineous [de-identified] : lateral area with mild excoriation [de-identified] : 100% eschar [de-identified] : Alginate Ag  [de-identified] : NSC\par Allevyn Border [de-identified] : 3x Weekly

## 2021-10-26 NOTE — PLAN
[FreeTextEntry1] : AgAlginate and Allevyn border to left buttock 3X/week\par return 2 weeks\par 30 minutes spent in review, evaluation and treatment planning
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
## 2021-10-27 DIAGNOSIS — Z82.3 FAMILY HISTORY OF STROKE: ICD-10-CM
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
## 2021-10-27 DIAGNOSIS — T31.0 BURNS INVOLVING LESS THAN 10% OF BODY SURFACE: ICD-10-CM

## 2021-10-27 DIAGNOSIS — T21.25XD BURN OF SECOND DEGREE OF BUTTOCK, SUBSEQUENT ENCOUNTER: ICD-10-CM

## 2021-10-27 DIAGNOSIS — Z83.3 FAMILY HISTORY OF DIABETES MELLITUS: ICD-10-CM

## 2021-10-27 DIAGNOSIS — I10 ESSENTIAL (PRIMARY) HYPERTENSION: ICD-10-CM

## 2021-10-27 DIAGNOSIS — Z79.899 OTHER LONG TERM (CURRENT) DRUG THERAPY: ICD-10-CM

## 2021-10-27 DIAGNOSIS — Z82.0 FAMILY HISTORY OF EPILEPSY AND OTHER DISEASES OF THE NERVOUS SYSTEM: ICD-10-CM

## 2021-10-27 DIAGNOSIS — Z87.891 PERSONAL HISTORY OF NICOTINE DEPENDENCE: ICD-10-CM

## 2021-10-27 DIAGNOSIS — E11.9 TYPE 2 DIABETES MELLITUS WITHOUT COMPLICATIONS: ICD-10-CM

## 2021-10-27 DIAGNOSIS — Z98.890 OTHER SPECIFIED POSTPROCEDURAL STATES: ICD-10-CM
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
## 2021-10-27 DIAGNOSIS — E78.00 PURE HYPERCHOLESTEROLEMIA, UNSPECIFIED: ICD-10-CM

## 2021-10-27 DIAGNOSIS — Z86.000 PERSONAL HISTORY OF IN-SITU NEOPLASM OF BREAST: ICD-10-CM
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
## 2021-10-27 DIAGNOSIS — E04.1 NONTOXIC SINGLE THYROID NODULE: ICD-10-CM

## 2021-10-27 DIAGNOSIS — Z82.49 FAMILY HISTORY OF ISCHEMIC HEART DISEASE AND OTHER DISEASES OF THE CIRCULATORY SYSTEM: ICD-10-CM

## 2021-10-27 DIAGNOSIS — Z79.82 LONG TERM (CURRENT) USE OF ASPIRIN: ICD-10-CM

## 2021-10-27 DIAGNOSIS — Z80.3 FAMILY HISTORY OF MALIGNANT NEOPLASM OF BREAST: ICD-10-CM

## 2021-10-27 DIAGNOSIS — Z90.710 ACQUIRED ABSENCE OF BOTH CERVIX AND UTERUS: ICD-10-CM

## 2021-11-09 ENCOUNTER — OUTPATIENT (OUTPATIENT)
Dept: OUTPATIENT SERVICES | Facility: HOSPITAL | Age: 66
LOS: 1 days | Discharge: HOME | End: 2021-11-09
Payer: COMMERCIAL

## 2021-11-09 ENCOUNTER — APPOINTMENT (OUTPATIENT)
Dept: WOUND CARE | Facility: HOSPITAL | Age: 66
End: 2021-11-09
Payer: COMMERCIAL

## 2021-11-09 VITALS
RESPIRATION RATE: 18 BRPM | OXYGEN SATURATION: 99 % | HEART RATE: 76 BPM | BODY MASS INDEX: 27.62 KG/M2 | HEIGHT: 67 IN | WEIGHT: 176 LBS | SYSTOLIC BLOOD PRESSURE: 124 MMHG | TEMPERATURE: 97.8 F | DIASTOLIC BLOOD PRESSURE: 78 MMHG

## 2021-11-09 DIAGNOSIS — T21.25XD: ICD-10-CM

## 2021-11-09 DIAGNOSIS — T31.0 BURNS INVOLVING LESS THAN 10% OF BODY SURFACE: ICD-10-CM

## 2021-11-09 DIAGNOSIS — L89.300 PRESSURE ULCER OF UNSPECIFIED BUTTOCK, UNSTAGEABLE: ICD-10-CM

## 2021-11-09 PROCEDURE — 99213 OFFICE O/P EST LOW 20 MIN: CPT

## 2021-11-09 PROCEDURE — G0463: CPT

## 2021-11-09 NOTE — ASSESSMENT
[Verbal] : Verbal [Patient] : Patient [Good - alert, interested, motivated] : Good - alert, interested, motivated [Verbalizes knowledge/Understanding] : Verbalizes knowledge/understanding [Dressing changes] : dressing changes [Skin Care] : skin care [Signs and symptoms of infection] : sign and symptoms of infection [How and When to Call] : how and when to call [Patient responsibility to plan of care] : patient responsibility to plan of care [Stable] : stable [Home] : Home [Ambulatory] : Ambulatory [Not Applicable - Long Term Care/Home Health Agency] : Long Term Care/Home Health Agency: Not Applicable [] : Yes [FreeTextEntry2] : Pressure Relief \par Localized Wound Care \par Maintain Skin Integrity \par  [FreeTextEntry4] : Pt to F/U to [facility] in 1 Month

## 2021-11-09 NOTE — REVIEW OF SYSTEMS
[Negative] : Heme/Lymph [FreeTextEntry5] : hypertension, hyperlipidemia [de-identified] : burn left buttock [de-identified] : DM2

## 2021-11-09 NOTE — HISTORY OF PRESENT ILLNESS
[FreeTextEntry1] : Patient presents with a traumatic wound on her left buttocks.  Patient reports that in early 9/2021 she was prescribed Flexeral, lidocaine patch and heating pad for back pain.  Patient believes that she developed the wound from heating pad.  Patient was seen by PCP and is applying Collagenase and duoderm to wound every 3 days.  \par \par 11/9/21 wound left buttock virtually closed

## 2021-11-09 NOTE — PHYSICAL EXAM
[2 x 2] : 2 x 2  [Normal Breath Sounds] : Normal breath sounds [Normal Heart Sounds] : normal heart sounds [Alert] : alert [Oriented to Person] : oriented to person [Oriented to Place] : oriented to place [Oriented to Time] : oriented to time [Calm] : calm [de-identified] : WD WN 66 Y/O with female in NAD [de-identified] : MONI LEWISM intact [de-identified] : supple [de-identified] : left buttock small almost completely closed buttock burn [FreeTextEntry1] : Left buttocks [FreeTextEntry2] : 0.1 [FreeTextEntry3] : 0.1 [FreeTextEntry4] : 0.1 [de-identified] : Marcin Veronica  [de-identified] : Cleansed with Normal Saline. \par  [TWNoteComboBox4] : None [de-identified] : Normal [de-identified] : 3x Weekly

## 2021-11-09 NOTE — PLAN
[FreeTextEntry1] :  Allevyn border to left buttock 3X/week\par discharged\par 20 minutes spent in review, evaluation and treatment planning
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
## 2021-11-10 DIAGNOSIS — Z82.0 FAMILY HISTORY OF EPILEPSY AND OTHER DISEASES OF THE NERVOUS SYSTEM: ICD-10-CM

## 2021-11-10 DIAGNOSIS — Z90.710 ACQUIRED ABSENCE OF BOTH CERVIX AND UTERUS: ICD-10-CM

## 2021-11-10 DIAGNOSIS — Z80.3 FAMILY HISTORY OF MALIGNANT NEOPLASM OF BREAST: ICD-10-CM

## 2021-11-10 DIAGNOSIS — Z79.899 OTHER LONG TERM (CURRENT) DRUG THERAPY: ICD-10-CM
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
## 2021-11-10 DIAGNOSIS — I10 ESSENTIAL (PRIMARY) HYPERTENSION: ICD-10-CM

## 2021-11-10 DIAGNOSIS — T31.0 BURNS INVOLVING LESS THAN 10% OF BODY SURFACE: ICD-10-CM

## 2021-11-10 DIAGNOSIS — E11.9 TYPE 2 DIABETES MELLITUS WITHOUT COMPLICATIONS: ICD-10-CM
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
## 2021-11-10 DIAGNOSIS — E78.00 PURE HYPERCHOLESTEROLEMIA, UNSPECIFIED: ICD-10-CM
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
## 2021-11-10 DIAGNOSIS — T21.25XD BURN OF SECOND DEGREE OF BUTTOCK, SUBSEQUENT ENCOUNTER: ICD-10-CM

## 2021-11-10 DIAGNOSIS — Z83.3 FAMILY HISTORY OF DIABETES MELLITUS: ICD-10-CM

## 2021-11-10 DIAGNOSIS — Z98.890 OTHER SPECIFIED POSTPROCEDURAL STATES: ICD-10-CM

## 2021-11-10 DIAGNOSIS — Z86.000 PERSONAL HISTORY OF IN-SITU NEOPLASM OF BREAST: ICD-10-CM

## 2021-11-10 DIAGNOSIS — Z87.891 PERSONAL HISTORY OF NICOTINE DEPENDENCE: ICD-10-CM

## 2021-11-10 DIAGNOSIS — Z82.3 FAMILY HISTORY OF STROKE: ICD-10-CM

## 2021-11-10 DIAGNOSIS — E04.1 NONTOXIC SINGLE THYROID NODULE: ICD-10-CM

## 2021-11-10 DIAGNOSIS — Z82.49 FAMILY HISTORY OF ISCHEMIC HEART DISEASE AND OTHER DISEASES OF THE CIRCULATORY SYSTEM: ICD-10-CM

## 2021-11-10 DIAGNOSIS — Z79.82 LONG TERM (CURRENT) USE OF ASPIRIN: ICD-10-CM

## 2022-11-04 ENCOUNTER — TRANSCRIPTION ENCOUNTER (OUTPATIENT)
Age: 67
End: 2022-11-04

## 2023-02-24 ENCOUNTER — APPOINTMENT (OUTPATIENT)
Dept: BREAST CENTER | Facility: CLINIC | Age: 68
End: 2023-02-24
Payer: MEDICARE

## 2023-02-24 VITALS
DIASTOLIC BLOOD PRESSURE: 74 MMHG | HEIGHT: 67 IN | WEIGHT: 168 LBS | HEART RATE: 68 BPM | BODY MASS INDEX: 26.37 KG/M2 | SYSTOLIC BLOOD PRESSURE: 127 MMHG

## 2023-02-24 DIAGNOSIS — D05.00 LOBULAR CARCINOMA IN SITU OF UNSPECIFIED BREAST: ICD-10-CM

## 2023-02-24 PROCEDURE — 99213 OFFICE O/P EST LOW 20 MIN: CPT

## 2023-02-24 RX ORDER — ESCITALOPRAM OXALATE 10 MG/1
10 TABLET, FILM COATED ORAL
Refills: 0 | Status: ACTIVE | COMMUNITY

## 2023-02-24 NOTE — PHYSICAL EXAM
[EOMI] : extra ocular movement intact [Sclera nonicteric] : sclera nonicteric [Supple] : supple [No Supraclavicular Adenopathy] : no supraclavicular adenopathy [No Cervical Adenopathy] : no cervical adenopathy [No Thyromegaly] : no thyromegaly [Clear to Auscultation Bilat] : clear to auscultation bilaterally [Normal Sinus Rhythm] : normal sinus rhythm [Normal S1, S2] : normal S1 and S2 [Examined in the supine and seated position] : examined in the supine and seated position [No dominant masses] : no dominant masses in right breast  [No dominant masses] : no dominant masses left breast [No Nipple Retraction] : no left nipple retraction [No Nipple Discharge] : no left nipple discharge [No Axillary Lymphadenopathy] : no left axillary lymphadenopathy [Soft] : abdomen soft [Not Tender] : non-tender [Grade 3] : Ptosis Grade 3 [de-identified] : Scar UIQ.

## 2023-02-24 NOTE — HISTORY OF PRESENT ILLNESS
[FreeTextEntry1] : The patient has a history of lobular carcinoma in situ of her breasts.  She took tamoxifen to lower risk and monitored with Dr. Martínez.  \par \par She was last seen 2021.  She has no current breast complaints.\par \par

## 2024-12-04 ENCOUNTER — APPOINTMENT (OUTPATIENT)
Dept: BREAST CENTER | Facility: CLINIC | Age: 69
End: 2024-12-04
Payer: MEDICARE

## 2024-12-04 VITALS
SYSTOLIC BLOOD PRESSURE: 126 MMHG | WEIGHT: 181 LBS | HEART RATE: 75 BPM | BODY MASS INDEX: 28.41 KG/M2 | DIASTOLIC BLOOD PRESSURE: 70 MMHG | HEIGHT: 67 IN

## 2024-12-04 DIAGNOSIS — D05.00 LOBULAR CARCINOMA IN SITU OF UNSPECIFIED BREAST: ICD-10-CM

## 2024-12-04 PROCEDURE — 99213 OFFICE O/P EST LOW 20 MIN: CPT

## 2025-03-18 ENCOUNTER — APPOINTMENT (OUTPATIENT)
Dept: OPHTHALMOLOGY | Facility: CLINIC | Age: 70
End: 2025-03-18